# Patient Record
(demographics unavailable — no encounter records)

---

## 2024-11-26 NOTE — HISTORY OF PRESENT ILLNESS
[8] : 8 [3] : 3 [Dull/Aching] : dull/aching [Sharp] : sharp [de-identified] : 11/26/2024: fall on stairs 1 month ago w/ ongoing ankle pain and swelling. went to  and given brace. inconsistent w/ brace at this point. no prior ankle probs. denies dm/tob. student at [employer] [] : no [FreeTextEntry1] : right ankle [de-identified] : compression wrap [de-identified] :  x2 [de-identified] : XR x 2

## 2025-01-07 NOTE — ASSESSMENT
[FreeTextEntry1] : wbat brace ice/elevate nsaids prn PT MRI to eval for ligament tear -- continued pain despite 6 wks of PT. further plan pending MRI

## 2025-01-07 NOTE — HISTORY OF PRESENT ILLNESS
[8] : 8 [3] : 3 [Dull/Aching] : dull/aching [Sharp] : sharp [de-identified] : 11/26/2024: fall on stairs 1 month ago w/ ongoing ankle pain and swelling. went to  and given brace. inconsistent w/ brace at this point. no prior ankle probs. denies dm/tob. student at [place]  01/07/2025: improving. using brace at times. going to professional PT [] : no [FreeTextEntry1] : right ankle [de-identified] : compression wrap [de-identified] :  x2 [de-identified] : XR x 2

## 2025-01-07 NOTE — PHYSICAL EXAM
[Right] : right foot and ankle [NL (40)] : plantar flexion 40 degrees [NL 30)] : inversion 30 degrees [NL (20)] : eversion 20 degrees [5___] : eversion 5[unfilled]/5 [2+] : dorsalis pedis pulse: 2+ [] : no achilles tendon tenderness [de-identified] : mildly pos ant drawer [[id number]] : dorsiflexion 15 degrees

## 2025-01-09 NOTE — PHYSICAL EXAM
[Right] : right foot and ankle [NL (40)] : plantar flexion 40 degrees [NL 30)] : inversion 30 degrees [NL (20)] : eversion 20 degrees [5___] : eversion 5[unfilled]/5 [2+] : dorsalis pedis pulse: 2+ [] : no achilles tendon tenderness [de-identified] : mildly pos ant drawer [[id number]] : dorsiflexion 15 degrees

## 2025-01-09 NOTE — DATA REVIEWED
[Outside X-rays] : outside x-rays [MRI] : MRI [Right] : of the right [Ankle] : ankle [I reviewed the films/CD and additionally noted] : I reviewed the films/CD and additionally noted [FreeTextEntry1] : no acute fx [FreeTextEntry2] : ATFL tear

## 2025-01-09 NOTE — ASSESSMENT
[FreeTextEntry1] : wbat brace continue PT discussed surgical treatment -- will do another round of PT and then reassess f/up 6 wks

## 2025-01-09 NOTE — HISTORY OF PRESENT ILLNESS
[8] : 8 [3] : 3 [Dull/Aching] : dull/aching [Sharp] : sharp [de-identified] : 11/26/2024: fall on stairs 1 month ago w/ ongoing ankle pain and swelling. went to  and given brace. inconsistent w/ brace at this point. no prior ankle probs. denies dm/tob. student at [place]  01/07/2025: improving. using brace at times. going to professional PT  01/09/2025:  MRI f/up. no sig change [] : no [FreeTextEntry1] : right ankle [de-identified] : compression wrap [de-identified] :  x2 [de-identified] : XR x 2